# Patient Record
Sex: FEMALE | Race: WHITE | NOT HISPANIC OR LATINO | Employment: FULL TIME | URBAN - METROPOLITAN AREA
[De-identification: names, ages, dates, MRNs, and addresses within clinical notes are randomized per-mention and may not be internally consistent; named-entity substitution may affect disease eponyms.]

---

## 2018-03-01 PROBLEM — F43.20 GRIEF REACTION: Status: ACTIVE | Noted: 2018-03-01

## 2018-06-04 PROBLEM — F43.20 GRIEF REACTION: Status: RESOLVED | Noted: 2018-03-01 | Resolved: 2018-06-04

## 2019-07-09 PROBLEM — M79.671 RIGHT FOOT PAIN: Status: ACTIVE | Noted: 2019-07-09

## 2021-02-09 PROBLEM — O09.93 HIGH-RISK PREGNANCY IN THIRD TRIMESTER: Status: ACTIVE | Noted: 2020-10-19

## 2024-02-21 ENCOUNTER — TELEPHONE (OUTPATIENT)
Dept: HEALTH INFORMATION MANAGEMENT | Facility: OTHER | Age: 39
End: 2024-02-21

## 2024-04-02 PROBLEM — G43.709 CHRONIC MIGRAINE WITHOUT AURA WITHOUT STATUS MIGRAINOSUS, NOT INTRACTABLE: Status: ACTIVE | Noted: 2017-06-08

## 2024-04-02 PROBLEM — D32.9 MENINGIOMA (HCC): Status: ACTIVE | Noted: 2024-04-02

## 2024-04-02 PROBLEM — H47.323 OPTIC DISC DRUSEN, BILATERAL: Status: ACTIVE | Noted: 2024-01-01

## 2024-04-05 ENCOUNTER — TELEPHONE (OUTPATIENT)
Dept: NEUROLOGY | Facility: MEDICAL CENTER | Age: 39
End: 2024-04-05
Payer: COMMERCIAL

## 2024-04-05 NOTE — TELEPHONE ENCOUNTER
NEUROLOGY PATIENT PRE-VISIT PLANNING     Patient was NOT contacted to complete PVP.  Note: Patient will not be contacted if there is no indication to call.     Patient Appointment is scheduled as: Established Patient     Is visit type and length scheduled correctly? Yes    [facility] in Patient Demographics? Yes    3.   Is referral attached to visit? Yes    4. Were records received from referring provider? Yes    4. Patient was NOT contacted to have someone accompany them to visit.     5. Is this appointment scheduled as a Hospital Follow-Up?  No    6. Does the patient require any pre procedure or post procedure follow up? No    7. If any orders were placed at last visit or intended to be done for this visit do we have Results/Consult Notes? No  Labs - Labs were not ordered at last office visit.  Imaging - Imaging was not ordered at last office visit.  Referrals - Referral ordered, patient was seen and consult notes are in chart. Care Teams updated  YES.  Note: If patient appointment is for lab or imaging review and patient did not complete the studies, check with provider if OK to reschedule patient until completed.    8. If patient appointment is for Botox - is order pended for provider? N/A    9. Was Plan Assessment from last Neurology Office Visit Reviewed?  Yes

## 2024-04-09 ENCOUNTER — APPOINTMENT (OUTPATIENT)
Dept: NEUROLOGY | Facility: MEDICAL CENTER | Age: 39
End: 2024-04-09
Attending: INTERNAL MEDICINE
Payer: COMMERCIAL

## 2025-01-02 ENCOUNTER — HOSPITAL ENCOUNTER (OUTPATIENT)
Dept: LAB | Facility: MEDICAL CENTER | Age: 40
End: 2025-01-02
Attending: NURSE PRACTITIONER
Payer: COMMERCIAL

## 2025-01-02 LAB
25(OH)D3 SERPL-MCNC: 14 NG/ML (ref 30–100)
ALBUMIN SERPL BCP-MCNC: 4.3 G/DL (ref 3.2–4.9)
ALBUMIN/GLOB SERPL: 1.4 G/DL
ALP SERPL-CCNC: 71 U/L (ref 30–99)
ALT SERPL-CCNC: 26 U/L (ref 2–50)
ANION GAP SERPL CALC-SCNC: 7 MMOL/L (ref 7–16)
AST SERPL-CCNC: 15 U/L (ref 12–45)
BASOPHILS # BLD AUTO: 1.1 % (ref 0–1.8)
BASOPHILS # BLD: 0.09 K/UL (ref 0–0.12)
BILIRUB SERPL-MCNC: 0.3 MG/DL (ref 0.1–1.5)
BUN SERPL-MCNC: 10 MG/DL (ref 8–22)
CALCIUM ALBUM COR SERPL-MCNC: 9 MG/DL (ref 8.5–10.5)
CALCIUM SERPL-MCNC: 9.2 MG/DL (ref 8.5–10.5)
CHLORIDE SERPL-SCNC: 99 MMOL/L (ref 96–112)
CHOLEST SERPL-MCNC: 301 MG/DL (ref 100–199)
CO2 SERPL-SCNC: 27 MMOL/L (ref 20–33)
CREAT SERPL-MCNC: 0.56 MG/DL (ref 0.5–1.4)
EOSINOPHIL # BLD AUTO: 0.48 K/UL (ref 0–0.51)
EOSINOPHIL NFR BLD: 5.7 % (ref 0–6.9)
ERYTHROCYTE [DISTWIDTH] IN BLOOD BY AUTOMATED COUNT: 43.2 FL (ref 35.9–50)
EST. AVERAGE GLUCOSE BLD GHB EST-MCNC: 114 MG/DL
GFR SERPLBLD CREATININE-BSD FMLA CKD-EPI: 117 ML/MIN/1.73 M 2
GLOBULIN SER CALC-MCNC: 3 G/DL (ref 1.9–3.5)
GLUCOSE SERPL-MCNC: 94 MG/DL (ref 65–99)
HBA1C MFR BLD: 5.6 % (ref 4–5.6)
HCT VFR BLD AUTO: 45.3 % (ref 37–47)
HDLC SERPL-MCNC: ABNORMAL MG/DL
HGB BLD-MCNC: 13.9 G/DL (ref 12–16)
IMM GRANULOCYTES # BLD AUTO: 0.02 K/UL (ref 0–0.11)
IMM GRANULOCYTES NFR BLD AUTO: 0.2 % (ref 0–0.9)
LDLC SERPL CALC-MCNC: ABNORMAL MG/DL
LYMPHOCYTES # BLD AUTO: 3.03 K/UL (ref 1–4.8)
LYMPHOCYTES NFR BLD: 35.9 % (ref 22–41)
MCH RBC QN AUTO: 26.5 PG (ref 27–33)
MCHC RBC AUTO-ENTMCNC: 30.7 G/DL (ref 32.2–35.5)
MCV RBC AUTO: 86.3 FL (ref 81.4–97.8)
MONOCYTES # BLD AUTO: 0.59 K/UL (ref 0–0.85)
MONOCYTES NFR BLD AUTO: 7 % (ref 0–13.4)
NEUTROPHILS # BLD AUTO: 4.24 K/UL (ref 1.82–7.42)
NEUTROPHILS NFR BLD: 50.1 % (ref 44–72)
NRBC # BLD AUTO: 0 K/UL
NRBC BLD-RTO: 0 /100 WBC (ref 0–0.2)
PLATELET # BLD AUTO: 348 K/UL (ref 164–446)
PMV BLD AUTO: 11.7 FL (ref 9–12.9)
POTASSIUM SERPL-SCNC: 4.2 MMOL/L (ref 3.6–5.5)
PROT SERPL-MCNC: 7.3 G/DL (ref 6–8.2)
RBC # BLD AUTO: 5.25 M/UL (ref 4.2–5.4)
SODIUM SERPL-SCNC: 133 MMOL/L (ref 135–145)
TRIGL SERPL-MCNC: 1334 MG/DL (ref 0–149)
TSH SERPL DL<=0.005 MIU/L-ACNC: 0.98 UIU/ML (ref 0.38–5.33)
WBC # BLD AUTO: 8.5 K/UL (ref 4.8–10.8)

## 2025-01-02 PROCEDURE — 83036 HEMOGLOBIN GLYCOSYLATED A1C: CPT

## 2025-01-02 PROCEDURE — 84443 ASSAY THYROID STIM HORMONE: CPT

## 2025-01-02 PROCEDURE — 80061 LIPID PANEL: CPT

## 2025-01-02 PROCEDURE — 80053 COMPREHEN METABOLIC PANEL: CPT

## 2025-01-02 PROCEDURE — 82306 VITAMIN D 25 HYDROXY: CPT

## 2025-01-02 PROCEDURE — 36415 COLL VENOUS BLD VENIPUNCTURE: CPT

## 2025-01-02 PROCEDURE — 85025 COMPLETE CBC W/AUTO DIFF WBC: CPT

## 2025-01-06 ENCOUNTER — APPOINTMENT (OUTPATIENT)
Dept: LAB | Facility: MEDICAL CENTER | Age: 40
End: 2025-01-06
Payer: COMMERCIAL

## 2025-02-19 ENCOUNTER — TELEPHONE (OUTPATIENT)
Dept: CARDIOLOGY | Facility: MEDICAL CENTER | Age: 40
End: 2025-02-19
Payer: COMMERCIAL

## 2025-02-19 NOTE — TELEPHONE ENCOUNTER
Called patient in regards to records for NP appointment with Dr. SAUCEDO 3/13/25. To review most recent lab results, ekg, any cardiac testing or ,if patient has been treated by a cardiologist. No answer, left voicemail to call back

## 2025-03-02 NOTE — PROGRESS NOTES
Medical decision making:  -Still neuroma primary prevention which is very good.  We need to check lipoprotein a and apolipoprotein B.   -Starting rosuvastatin 40 mg, repeating lipids in 4 weeks, then most likely left to start fenofibrate.  Consider referral to vascular medicine.  I suspect she is going to need more targeted therapies for triglycerides which can include fish oil or Vascepa.  -Father had problems with statins.  If she has statin intolerance, we will check a CK, vitamin D, TSH and ask her to take co-Q10  -She may be interested in a calcium score, information given today.  This is optional since she is going on statin therapy anyway but she like to know more about her risk.  -Blood pressure might be a little bit on the low side.  Propranolol has yet to help with migraine headache so she is willing to try to double the dose and if blood pressure remains low, she can reduce amlodipine to 2.5 or discontinue altogether.  -Quit smoking which is fantastic.  -No evidence to say she needs primary prevention aspirin at this time.  I would reconsider if she had elevated lipoprotein a or calcification in the proximal LAD.  -Family-planning, tubal ligation.  Okay to take lisinopril.  No cough.      Problem list:  1. Primary hypertension  - EKG    2. Pre-eclampsia in second trimester    3. Mixed hyperlipidemia  - rosuvastatin (CRESTOR) 40 MG tablet; Take 1 Tablet by mouth every day.  Dispense: 90 Tablet; Refill: 3  - APOLIPOPROTEIN B; Future  - Lipoprotein (a); Future  - Lipid Profile; Future  - CT-CARDIAC SCORING; Future    4. Hypertriglyceridemia    5. Chronic migraine with aura, intractable, without status migrainosus  - propranolol LA (INDERAL LA) 60 MG CAPSULE SR 24 HR; Take 2 Capsules by mouth every day. For migraine prevention  Dispense: 180 Capsule; Refill: 3    6. Allergy to iodine    Other orders  - VITAMIN D PO; Take 5,000 Units by mouth every day.     Chief Complaint:   Chief Complaint   Patient  presents with    New Patient     NP Dx:Hyperlipidemia     History of Present Illness:  Nuris Mcgill is a 39 y.o. female who is referred by MARKOS Shafer for FH CAD/SCD, mixed HLP, HTN, iodine allergy, former smoker.     Father was very physically fit, taken off statin due to statin intolerance and myalgias, was planning to go out for a run, suffered MI and sudden cardiac death, found at home by his wife, her mother.  Her sister  suddenly at 44, we presume heart but were not sure, had hypertension, hyperlipidemia, DM2, smoker, nonadherent ended already had a stroke.    Hypertension, appears controlled.  Previously checking at home but not recently, will get back to it.  BP might actually be a little bit low.  Propranolol given as it may help with migraine headaches but no improvement yet.  Going up on the dose.  On lisinopril without a cough.    Cholesterol very abnormal, triglycerides over 1000.  LDL could not be calculated.  Has never tried medications.    Quit smoking in .    Family planning, no more pregnancies planned, had tubal ligation.    No history of diabetes.  No history of autoimmune disease such as lupus or rheumatoid arthritis.  No history of chest radiation or cardiotoxic chemotherapy.  No chronic kidney disease.  No ETOH overuse.  No caffeine overuse.  No recreation substance use.  No hx asthma.  Covid, recovered 100%.    Not limited by chest pain, pressure or tightness with activity.  No significant dyspnea on exertion, orthopnea or lower extremity swelling.  No significant palpitations, lightheadedness, or presyncope/syncope.  No symptoms of leg claudication.  No stroke/TIA like symptoms.    Lives in [place].   to Pedro.  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    Wt Readings from Last 5 Encounters:   25 88.5 kg (195 lb)   24 89.7 kg (197 lb 12 oz)   23 95.3 kg (210 lb 1.6 oz)   22 93.4 kg (206 lb)   22 93.8 kg (206 lb  "11.2 oz)       DATA REVIEWED by me:  ECG (my personal interpretation)  3-13-25 Sinus 55    Echo  5-4-22  Normal left ventricular size and systolic function.  Normal right ventricular size and systolic function.  Mild mitral regurgitation.  Mild tricuspid regurgitation.  Normal pericardium without effusion.  Compared to the prior echo on 01/13/21, similar findings.  1-13-21  Normal right and left ventricular size and function.   Left ventricular ejection fraction is visually estimated to be 65%.  Mild concentric left ventricular hypertrophy.  No significant valve disease or flow abnormalities.   Normal estimated right atrial pressure.   Unable to estimate pulmonary artery pressure due to an inadequate   tricuspid regurgitant jet.  No prior study is available for comparison.     Most recent labs:       Lab Results   Component Value Date/Time    WBC 8.5 01/02/2025 11:28 AM    HEMOGLOBIN 13.9 01/02/2025 11:28 AM    HEMATOCRIT 45.3 01/02/2025 11:28 AM    MCV 86.3 01/02/2025 11:28 AM    INR 0.91 12/20/2023 02:59 PM      Lab Results   Component Value Date/Time    SODIUM 133 (L) 01/02/2025 11:28 AM    POTASSIUM 4.2 01/02/2025 11:28 AM    CHLORIDE 99 01/02/2025 11:28 AM    CO2 27 01/02/2025 11:28 AM    GLUCOSE 94 01/02/2025 11:28 AM    BUN 10 01/02/2025 11:28 AM    CREATININE 0.56 01/02/2025 11:28 AM    CREATININE 0.7 11/12/2006 04:35 AM      Lab Results   Component Value Date/Time    ASTSGOT 15 01/02/2025 11:28 AM    ALTSGPT 26 01/02/2025 11:28 AM    ALBUMIN 4.3 01/02/2025 11:28 AM      Lab Results   Component Value Date/Time    CHOLSTRLTOT 301 (H) 01/02/2025 11:28 AM    LDL see below 01/02/2025 11:28 AM    HDL see below 01/02/2025 11:28 AM    TRIGLYCERIDE 1334 (H) 01/02/2025 11:28 AM     No results for input(s): \"NTPROBNP\", \"TROPONINT\" in the last 72 hours.      Past Medical History:   Diagnosis Date    Depression     Essential hypertension     Glaucoma 2024    Dr. Osman    Migraine     since 14 years old    " "Nephrolithiasis     Optic disc drusen, bilateral     Panic disorder     PTSD (post-traumatic stress disorder)      Past Surgical History:   Procedure Laterality Date    PRIMARY C SECTION WITH TUBAL LIGATION N/A 2022    Procedure:  SECTION, PRIMARY, WITH TUBAL LIGATION;  Surgeon: Mingo Beckham M.D.;  Location: [facility];  Service: Obstetrics    PRIMARY C SECTION N/A 3/2/2021    Procedure:  SECTION, PRIMARY;  Surgeon: Kimberly Parker M.D.;  Location: [facility];  Service: Obstetrics    CHOLECYSTECTOMY      MAMMOPLASTY AUGMENTATION  08    Performed by DEYA BRYANT at SURGERY [facility]    APPENDECTOMY      \"ruptured\"    OTHER      ablation back surgery    OTHER Right     skin graft on ear drum     Family History   Problem Relation Age of Onset    Hyperlipidemia Mother     Hypertension Mother     Heart Disease Father 64         of sudden MI, very fit    Heart Attack Father     Heart Disease Sister 44         at 44, no autopsy, DM, smoker, HTN, non-complaint    Stroke Sister     Diabetes Sister     Cancer Maternal Aunt         breast    Alcohol abuse Maternal Aunt     Cancer Maternal Grandmother         colon    Cancer Paternal Grandfather         colon     Social History     Socioeconomic History    Marital status:      Spouse name: Not on file    Number of children: Not on file    Years of education: Not on file    Highest education level: Not on file   Occupational History    Not on file   Tobacco Use    Smoking status: Former     Current packs/day: 0.00     Types: Cigarettes    Smokeless tobacco: Never   Vaping Use    Vaping status: Never Used   Substance and Sexual Activity    Alcohol use: Yes     Comment: occ     Drug use: No    Sexual activity: Yes     Partners: Male     Comment: Not  and planned pregnancy.   Other Topics Concern    Not on file   Social History Narrative    Not on file     Social " Drivers of Health     Financial Resource Strain: Not on file   Food Insecurity: No Food Insecurity (1/8/2021)    Hunger Vital Sign     Worried About Running Out of Food in the Last Year: Never true     Ran Out of Food in the Last Year: Never true   Transportation Needs: No Transportation Needs (1/8/2021)    PRAPARE - Transportation     Lack of Transportation (Medical): No     Lack of Transportation (Non-Medical): No   Physical Activity: Not on file   Stress: Not on file   Social Connections: Not on file   Intimate Partner Violence: Not on file   Housing Stability: Not on file     Allergies   Allergen Reactions    Ceftriaxone Hives     Tolerates cefazolin, penicillin    Iodine Contrast [[employer]] Hives    Pecan Hives and Swelling    [place] Hives and Swelling       Current Outpatient Medications   Medication Sig Dispense Refill    VITAMIN D PO Take 5,000 Units by mouth every day.      propranolol LA (INDERAL LA) 60 MG CAPSULE SR 24 HR Take 2 Capsules by mouth every day. For migraine prevention 180 Capsule 3    rosuvastatin (CRESTOR) 40 MG tablet Take 1 Tablet by mouth every day. 90 Tablet 3    latanoprost (XALATAN) 0.005 % Solution       ondansetron (ZOFRAN ODT) 4 MG TABLET DISPERSIBLE Take 1 Tablet by mouth every 8 hours as needed.      amLODIPine (NORVASC) 5 MG Tab Take 1 Tablet by mouth every day. 30 Tablet 0    fluoxetine (PROZAC) 40 MG capsule Take 40 mg by mouth every evening.      lisinopril (PRINIVIL) 40 MG tablet Take 40 mg by mouth every evening.      rizatriptan (MAXALT-MLT) 10 MG disintegrating tablet Take 1 Tablet by mouth one time as needed for Migraine for up to 1 dose. Can repeat dose after 2 hours if needed (Patient not taking: Reported on 3/13/2025) 10 Tablet 3    aspirin 81 MG EC tablet Take 81 mg by mouth every 6 hours as needed (headache). (Patient not taking: Reported on 3/13/2025)      oxymetazoline (AFRIN) 0.05 % Solution Administer 1 Spray into affected nostril(S) 2 times a  "day. (Patient not taking: Reported on 3/13/2025)       No current facility-administered medications for this visit.       ROS  All others systems reviewed and negative.    /60 (BP Location: Left arm, Patient Position: Sitting, BP Cuff Size: Adult)   Pulse 63   Resp 16   Ht 1.575 m (5' 2\")   Wt 88.5 kg (195 lb)   SpO2 95%  Body mass index is 35.67 kg/m².    General: No acute distress. Well nourished.  HEENT: EOM grossly intact, no scleral icterus, no pharyngeal erythema.   Neck:  No JVD, no bruits, trachea midline  CVS: RRR. Normal S1, S2. 1/6 sys murmur R/LSB. No LE edema.  2+ radial pulses, 2+ PT pulses  Resp: CTAB. No wheezing or crackles/rhonchi. Normal respiratory effort.  Abdomen: Soft, NT, no geraldo hepatomegaly.  MSK/Ext: No clubbing or cyanosis.  Skin: Warm and dry, no rashes.  Neurological: CN III-XII grossly intact. No focal deficits.   Psych: A&O x 3, appropriate affect, good judgement      Return in about 8 weeks (around 5/8/2025).    Written instructions given today:      CHOLESTEROL:  -Restart was rosuvastatin 40 mg taken once daily.  I recommend follow-up cholesterol blood test 4 to 6 weeks after starting the medication.  I will probably need to add fenofibrate to help with the triglycerides.  I might refer you to the vascular medicine clinic where we have people who still expertise is to work with your abnormal triglycerides.  -Abstain from all alcohol if you can  -The biggest risk to triglycerides being this elevated is acute pancreatitis.  -5 to 6% of the population gets achy muscles on the statins, read below about what to do if you think this might be you.  You should not suffer in silence.  We have ways to address statin intolerance.  -The fasting blood work I have ordered it can wait until after you have been on the rosuvastatin for at least 4 weeks.  Read below about ApoB and lipoprotein a.    BLOOD PRESSURE:  -Propranolol can be a great medication to help with migraine headaches.  " If you are not getting any improvement on the 60 mg, I recommend going up to 120 mg.  We do not worry about normal resting slow heart rates.  What matters is that your heart rate speeds up when you get up to walk around.    -If your blood pressure is consistently under 110 on the top number, you can cut your amlodipine in half to 2.5 mg or discontinue the medication altogether.    It is important to check blood pressure at home from time to time at home to ensure your blood pressure is in a good range because we do not check it correctly in the doctor's office.    Checking Blood Pressure:    -Be sure you have a good blood pressure cuff, spend in the $40-60, ideally a “calibrated” cuff such as Omron.  I recommend purchasing your cuff from a company with a good return policy.    -Your BP machine should be an automatic, upper arm cuff.  -Measure your arm circumference to get the correct size, probably adult Large. If your arm circumference is greater than the size listed (typically 16.5-17 in), you may need an XL cuff (order from a special pharmacy or durable medical equipment company).  If your arm is small you might need an Adult Small or XL.    -Sit with back support, legs uncrossed, feet flat on the floor.  Your arm should be propped up level with your heart but completely supported, dead weight.  -Put the cuff in place, then wait 5 minutes before pushing the button, 10 minutes would be ideal. No talking, no TV, no social media, etc. Set a timer on your phone.    -Do not check blood pressure within 30 minutes of exercise, caffeine, or large meals.    -It is ok to check immediately if you are experiencing a symptom which may be related to high or low blood pressure.    -Write your blood pressures down, keep a log and bring this to your appointments.    -Check no more than 1 time a day on normal days, maybe 1-2 times per week, try different times of day.  Typically do not check in the morning before your medications  unless this information is helpful for you.    -You are concerned your blood pressure cuff is not giving you accurate data, you can bring your cuff to at least one nursing appointment where it can be calibrated to a manual cuff.    -Goal blood pressure is at least under 130/80, ideally under 120/80.  If you think your BP is overall too high, reach out to your PCP or another provider for assistance.    Salt restriction is key for blood pressure control. Salt is killing Americans, it is in almost everything.  Salt=sodium=sea salt, guidelines say stay under 2,400 mg daily but I ask for under 3,000 mg daily if possible.  Get salt smart, start looking at labels, count it up.  Salt is hidden in everything, salad dressing, sauces, cheese, most canned food, any processed meat.     Primary prevention of heart attack and stroke:    Most people over 70 have some degree of calcified heart disease that is not clinically relevant. Unfortunately, placing a stent over heart disease that is not clinically relevant does not reduce mortality.  We cannot bypass blockages that are not 70% or more because the bypass grafts will not mature and will be lost.  Blockages in the heart artery should only have a stent or bypass if they are more than 70% blocked and causing symptoms (at which point most people have symptoms such as chest pain or unusual shortness of breath with activity that goes away with rest).  People having a sudden heart attack should have a stent placed in the blocked artery.  People having chest pain that limits their ability to function could consider having a stent placed in the artery.  Otherwise, medication and lifestyle changes are the preferred treatments and are very powerful.    Lifestyle and medications are typically more important than stents or bypass outside of a medical emergency.    The only things to do for calcified heart disease to lower your risk of sudden heart attack (or stroke which is the same  "disease but a different location- in the brain):  -Keep LDL cholesterol under 100 (or under 70 if you have already had a heart attack/stroke or documented vascular disease). Check Lipoprotein A and Apolipoprotein B to know more about your risk.  -If appropriate, take a statin medication (as a vascular medication, not just a cholesterol medication) which is our most powerful weapon to stabilize the plaques and reduce inflammation making them less likely to rupture open and cause a sudden heart attack/stroke.  -Control blood pressure, under 130/80, ideally closer to 120/80.  -Control blood sugar, don't get diabetes.  -Don't smoke.  -Eat a heart healthy diet that reduces inflammation: Pritikin, Mediterranean, Vegetarian, Vegan  -Get regular exercise: 150 minutes of moderate exercise OR 75 minutes of very vigorous exercise every week.  -Keep your body mass index under 30, ideal BMI is 20-25.  -Find demetrius in life, manage stress, develop close/meaningful personal relationships (proven to help people live longer)  -Know the signs and symptoms of heart attack and stroke and when to call 9-1-1.    Signs of a heart attack: Anything very intense coming from the chest that lasts more than 15 minutes, consider calling 911.  -Pain or pressure in the chest that is intense, lasts more than 15 minutes, not explained by other known reasons such as known/similar heartburn  -It can feel like severe heart burn but associated with nausea, vomiting  -It can be vague pain that radiates to the neck, jaw, shoulders, arm/arms, wrap around your back or even cause a toothache  -Can be associated with unusual shortness of breath at rest or sense of impending doom  *If you think you are having a heart attack, chew up 4 baby aspirin (81 mg) and call 911.    Signs of a stroke: sudden inability to talk, smile on one side, confusion, inability to move arm/leg on one side, numbness/tingling of arm/leg on one side that is not typical. \"Think " "FAST.\"  F=face drooping  A=arm weakness  S=Speech difficulty  T= time to call 911 (do not give ride to someone, call ambulance to alert the hospital to start stroke protocol)    Please look into the following food lifestyles:  Mediterranean diet.  Pritikin - used at [facility], probably one of the best for anti-inflammatory  Vegan/Vegetarian diet.  DASH diet.    Resources to learn more:  -American Heart Association  -Www.Cardiosmart.org, sponsored by the American College of cardiology.    Regarding statins:  -There is a lot of misinformation about statins.  -It is incredibly rare to have a debilitating reaction affecting the muscles.  -Some people have achy muscles without damage (this occurs in 5 to 6% of the population).  -If statin therapy is right for you and you cannot tolerate 1 statin, we have many different statins to try.  -Sometimes we find a reversible cause of statin intolerance such as vitamin D deficiency or co-Q10 deficiency.  -Keep working with your provider until you find a dose and brand of statin therapy that you can take without having any side effects.  -Side effects can also include abnormal liver testing which does not lead to permanent liver damage and sometimes we need to keep patients on statins and tolerate mildly abnormal liver function testing.  -For some people, blood sugars increase slightly on statins.  1 in 500 people with pre-diabetes become diabetic 6 months faster.  -Statins do not cause Alzheimer's  -If someone makes it to 75 without a reason to be on a statin for primary prevention then it is not necessary to start statin therapy.    Statin holiday:  Your cholesterol medication can cause muscle aches/sore joints but sometimes it can be difficult to know if it is happening.  Stop your statin therapy for 1 week, note if you feel better, resume the therapy, note if you feel worse again.  Report back to me if you think your statin is making you feel " worse.      Calcium Scores-AKA the tie breaker for statins  *we do not need this for you as we are going to start statins anyway, however if you are interested to know more about your risk, it is a reasonable thing to consider if it is affordable*:    A calcium score is a low radiation dose CT scan to look for calcification of the heart arteries.  It is a screening tool to help assess cardiovascular risk.  It can only reveal calcified disease in the heart arteries, it is still possible to have dark plaque coronary disease that cannot be seen on the scan.  It cannot tell us if the disease is on the inside or outside of the vessel, only whether or not it is there (like a pregnancy test, it is either positive or negative). I use it as a tool to decide if statin therapy is indicated (such as Lipitor or Crestor) to help with primary prevention of heart attack.  Statin therapy provides anti-inflammatory therapy to stabilize plaque in the vessels reducing the risk of plaque rupture heart attack (or stroke) but in doing so often leads to further calcification of the heart arteries and therefore I do not repeat the scan as it can look worse after statin therapy.  If you have other indications to be on statin therapy then I generally do not recommend the test.  It is a personal decision.  It is an out of pocket expense of approximately $100-200 and can be ordered at any time.  If done at [facility], we can review the images together. Let me know if you think this test is right for you.  Again, I typically only order it if it will /therapy, again such as starting statin therapy. I typically do not order the test in people over 70.      Family History of Heart Disease:  *If you have a strong family history of heart attack, bypass surgery or stents in the heart, consider having yourself and everyone in your family take a fasting blood test called Lipoprotein A to know more about your genetic risk.  Lipoprotein A,  "also known as \"Lipo little a,\" Lp(a)-things to know:  *Elevated Lp(a) is considered an independent risk factor for heart attack and stroke.  *It is possible to have a normal LDL cholesterol (LDL-C) and still have elevated Lp(a).  *Elevated Lp(a) is genetic/inherited 80-90% of the time.  *At present there are no targeted therapies but there are clinical trials.  *For some patients Repatha/Praluent (PCSK9 inhibitor therapies) have been shown to reduce Lp(a) levels but the long term benefit is not yet known.  *Traditional risk factor modification such as eating well and exercising does not impact Lp(a) levels.  *Lp(a) elevation is considered a triple threat because it leads to plaque build up in the blood vessels, increases inflammation which drives plaque rupture resulting in sudden heart attacks and strokes and is also pro-thrombotic (meaning promoting a blood clot to form in the setting of a plaque rupture event).  *Some people with elevated levels may benefit from aspirin therapy but we do not yet know who those people may be. A case by case discussion is needed with your provider.  *Although taking a statin does not lower the level of Lp(a), the statin can reduce the inflammation to protect against plaque rupture.  Eating well, exercising, lower stress, controlling blood sugar also reduce and offset inflammation and are still very important for your health.    *Apolipoprotein B is now known to be a better marker of risk over the traditional LDLc.      It is my pleasure to participate in the care of Ms. Mcgill.  Please do not hesitate to contact me with questions or concerns.    Madhavi Queen MD, [facility]  Cardiologist, [facility]    Please note that this dictation was created using voice recognition software. I have made every reasonable attempt to correct obvious errors, but it is possible there are errors of grammar and possibly content that I did not discover before finalizing " the note.

## 2025-03-03 ENCOUNTER — APPOINTMENT (OUTPATIENT)
Dept: OPHTHALMOLOGY | Facility: MEDICAL CENTER | Age: 40
End: 2025-03-03
Payer: COMMERCIAL

## 2025-03-13 ENCOUNTER — OFFICE VISIT (OUTPATIENT)
Dept: CARDIOLOGY | Facility: MEDICAL CENTER | Age: 40
End: 2025-03-13
Attending: INTERNAL MEDICINE
Payer: COMMERCIAL

## 2025-03-13 VITALS
RESPIRATION RATE: 16 BRPM | OXYGEN SATURATION: 95 % | HEIGHT: 62 IN | BODY MASS INDEX: 35.88 KG/M2 | WEIGHT: 195 LBS | DIASTOLIC BLOOD PRESSURE: 60 MMHG | SYSTOLIC BLOOD PRESSURE: 100 MMHG | HEART RATE: 63 BPM

## 2025-03-13 DIAGNOSIS — E78.1 HYPERTRIGLYCERIDEMIA: ICD-10-CM

## 2025-03-13 DIAGNOSIS — I10 PRIMARY HYPERTENSION: ICD-10-CM

## 2025-03-13 DIAGNOSIS — E78.2 MIXED HYPERLIPIDEMIA: ICD-10-CM

## 2025-03-13 DIAGNOSIS — G43.E19 CHRONIC MIGRAINE WITH AURA, INTRACTABLE, WITHOUT STATUS MIGRAINOSUS: ICD-10-CM

## 2025-03-13 DIAGNOSIS — O14.92 PRE-ECLAMPSIA IN SECOND TRIMESTER: ICD-10-CM

## 2025-03-13 DIAGNOSIS — Z88.8 ALLERGY TO IODINE: ICD-10-CM

## 2025-03-13 LAB — EKG IMPRESSION: NORMAL

## 2025-03-13 PROCEDURE — 93005 ELECTROCARDIOGRAM TRACING: CPT | Mod: TC | Performed by: INTERNAL MEDICINE

## 2025-03-13 PROCEDURE — 93010 ELECTROCARDIOGRAM REPORT: CPT | Performed by: INTERNAL MEDICINE

## 2025-03-13 PROCEDURE — 99204 OFFICE O/P NEW MOD 45 MIN: CPT | Performed by: INTERNAL MEDICINE

## 2025-03-13 PROCEDURE — 99213 OFFICE O/P EST LOW 20 MIN: CPT | Performed by: INTERNAL MEDICINE

## 2025-03-13 PROCEDURE — 3074F SYST BP LT 130 MM HG: CPT | Performed by: INTERNAL MEDICINE

## 2025-03-13 PROCEDURE — 3078F DIAST BP <80 MM HG: CPT | Performed by: INTERNAL MEDICINE

## 2025-03-13 RX ORDER — ROSUVASTATIN CALCIUM 40 MG/1
40 TABLET, COATED ORAL DAILY
Qty: 90 TABLET | Refills: 3 | Status: SHIPPED | OUTPATIENT
Start: 2025-03-13 | End: 2026-04-17

## 2025-03-13 RX ORDER — PROPRANOLOL HYDROCHLORIDE 60 MG/1
120 CAPSULE, EXTENDED RELEASE ORAL DAILY
Qty: 180 CAPSULE | Refills: 3 | Status: SHIPPED | OUTPATIENT
Start: 2025-03-13 | End: 2026-03-13

## 2025-03-13 ASSESSMENT — FIBROSIS 4 INDEX: FIB4 SCORE: 0.35

## 2025-03-13 NOTE — PATIENT INSTRUCTIONS
CHOLESTEROL:  -Restart was rosuvastatin 40 mg taken once daily.  I recommend follow-up cholesterol blood test 4 to 6 weeks after starting the medication.  I will probably need to add fenofibrate to help with the triglycerides.  I might refer you to the vascular medicine clinic where we have people who still expertise is to work with your abnormal triglycerides.  -Abstain from all alcohol if you can  -The biggest risk to triglycerides being this elevated is acute pancreatitis.  -5 to 6% of the population gets achy muscles on the statins, read below about what to do if you think this might be you.  You should not suffer in silence.  We have ways to address statin intolerance.  -The fasting blood work I have ordered it can wait until after you have been on the rosuvastatin for at least 4 weeks.  Read below about ApoB and lipoprotein a.    BLOOD PRESSURE:  -Propranolol can be a great medication to help with migraine headaches.  If you are not getting any improvement on the 60 mg, I recommend going up to 120 mg.  We do not worry about normal resting slow heart rates.  What matters is that your heart rate speeds up when you get up to walk around.    -If your blood pressure is consistently under 110 on the top number, you can cut your amlodipine in half to 2.5 mg or discontinue the medication altogether.    It is important to check blood pressure at home from time to time at home to ensure your blood pressure is in a good range because we do not check it correctly in the doctor's office.    Checking Blood Pressure:    -Be sure you have a good blood pressure cuff, spend in the $40-60, ideally a “calibrated” cuff such as Omron.  I recommend purchasing your cuff from a company with a good return policy.    -Your BP machine should be an automatic, upper arm cuff.  -Measure your arm circumference to get the correct size, probably adult Large. If your arm circumference is greater than the size listed (typically 16.5-17 in), you  may need an XL cuff (order from a special pharmacy or durable medical equipment company).  If your arm is small you might need an Adult Small or XL.    -Sit with back support, legs uncrossed, feet flat on the floor.  Your arm should be propped up level with your heart but completely supported, dead weight.  -Put the cuff in place, then wait 5 minutes before pushing the button, 10 minutes would be ideal. No talking, no TV, no social media, etc. Set a timer on your phone.    -Do not check blood pressure within 30 minutes of exercise, caffeine, or large meals.    -It is ok to check immediately if you are experiencing a symptom which may be related to high or low blood pressure.    -Write your blood pressures down, keep a log and bring this to your appointments.    -Check no more than 1 time a day on normal days, maybe 1-2 times per week, try different times of day.  Typically do not check in the morning before your medications unless this information is helpful for you.    -You are concerned your blood pressure cuff is not giving you accurate data, you can bring your cuff to at least one nursing appointment where it can be calibrated to a manual cuff.    -Goal blood pressure is at least under 130/80, ideally under 120/80.  If you think your BP is overall too high, reach out to your PCP or another provider for assistance.    Salt restriction is key for blood pressure control. Salt is killing Americans, it is in almost everything.  Salt=sodium=sea salt, guidelines say stay under 2,400 mg daily but I ask for under 3,000 mg daily if possible.  Get salt smart, start looking at labels, count it up.  Salt is hidden in everything, salad dressing, sauces, cheese, most canned food, any processed meat.     Primary prevention of heart attack and stroke:    Most people over 70 have some degree of calcified heart disease that is not clinically relevant. Unfortunately, placing a stent over heart disease that is not clinically  relevant does not reduce mortality.  We cannot bypass blockages that are not 70% or more because the bypass grafts will not mature and will be lost.  Blockages in the heart artery should only have a stent or bypass if they are more than 70% blocked and causing symptoms (at which point most people have symptoms such as chest pain or unusual shortness of breath with activity that goes away with rest).  People having a sudden heart attack should have a stent placed in the blocked artery.  People having chest pain that limits their ability to function could consider having a stent placed in the artery.  Otherwise, medication and lifestyle changes are the preferred treatments and are very powerful.    Lifestyle and medications are typically more important than stents or bypass outside of a medical emergency.    The only things to do for calcified heart disease to lower your risk of sudden heart attack (or stroke which is the same disease but a different location- in the brain):  -Keep LDL cholesterol under 100 (or under 70 if you have already had a heart attack/stroke or documented vascular disease). Check Lipoprotein A and Apolipoprotein B to know more about your risk.  -If appropriate, take a statin medication (as a vascular medication, not just a cholesterol medication) which is our most powerful weapon to stabilize the plaques and reduce inflammation making them less likely to rupture open and cause a sudden heart attack/stroke.  -Control blood pressure, under 130/80, ideally closer to 120/80.  -Control blood sugar, don't get diabetes.  -Don't smoke.  -Eat a heart healthy diet that reduces inflammation: Pritikin, Mediterranean, Vegetarian, Vegan  -Get regular exercise: 150 minutes of moderate exercise OR 75 minutes of very vigorous exercise every week.  -Keep your body mass index under 30, ideal BMI is 20-25.  -Find demetrius in life, manage stress, develop close/meaningful personal relationships (proven to help people  "live longer)  -Know the signs and symptoms of heart attack and stroke and when to call 9-1-1.    Signs of a heart attack: Anything very intense coming from the chest that lasts more than 15 minutes, consider calling 911.  -Pain or pressure in the chest that is intense, lasts more than 15 minutes, not explained by other known reasons such as known/similar heartburn  -It can feel like severe heart burn but associated with nausea, vomiting  -It can be vague pain that radiates to the neck, jaw, shoulders, arm/arms, wrap around your back or even cause a toothache  -Can be associated with unusual shortness of breath at rest or sense of impending doom  *If you think you are having a heart attack, chew up 4 baby aspirin (81 mg) and call 911.    Signs of a stroke: sudden inability to talk, smile on one side, confusion, inability to move arm/leg on one side, numbness/tingling of arm/leg on one side that is not typical. \"Think FAST.\"  F=face drooping  A=arm weakness  S=Speech difficulty  T= time to call 911 (do not give ride to someone, call ambulance to alert the hospital to start stroke protocol)    Please look into the following food lifestyles:  Mediterranean diet.  Pritikin - used at [facility], probably one of the best for anti-inflammatory  Vegan/Vegetarian diet.  DASH diet.    Resources to learn more:  -American Heart Association  -Www.Cardiosmart.org, sponsored by the American College of cardiology.    Regarding statins:  -There is a lot of misinformation about statins.  -It is incredibly rare to have a debilitating reaction affecting the muscles.  -Some people have achy muscles without damage (this occurs in 5 to 6% of the population).  -If statin therapy is right for you and you cannot tolerate 1 statin, we have many different statins to try.  -Sometimes we find a reversible cause of statin intolerance such as vitamin D deficiency or co-Q10 deficiency.  -Keep working with your provider until you " find a dose and brand of statin therapy that you can take without having any side effects.  -Side effects can also include abnormal liver testing which does not lead to permanent liver damage and sometimes we need to keep patients on statins and tolerate mildly abnormal liver function testing.  -For some people, blood sugars increase slightly on statins.  1 in 500 people with pre-diabetes become diabetic 6 months faster.  -Statins do not cause Alzheimer's  -If someone makes it to 75 without a reason to be on a statin for primary prevention then it is not necessary to start statin therapy.    Statin holiday:  Your cholesterol medication can cause muscle aches/sore joints but sometimes it can be difficult to know if it is happening.  Stop your statin therapy for 1 week, note if you feel better, resume the therapy, note if you feel worse again.  Report back to me if you think your statin is making you feel worse.      Calcium Scores-AKA the tie breaker for statins *we do not need this for you as we are going to start statins anyway, however if you are interested to know more about your risk, it is a reasonable thing to consider if it is affordable*:    A calcium score is a low radiation dose CT scan to look for calcification of the heart arteries.  It is a screening tool to help assess cardiovascular risk.  It can only reveal calcified disease in the heart arteries, it is still possible to have dark plaque coronary disease that cannot be seen on the scan.  It cannot tell us if the disease is on the inside or outside of the vessel, only whether or not it is there (like a pregnancy test, it is either positive or negative). I use it as a tool to decide if statin therapy is indicated (such as Lipitor or Crestor) to help with primary prevention of heart attack.  Statin therapy provides anti-inflammatory therapy to stabilize plaque in the vessels reducing the risk of plaque rupture heart attack (or stroke) but in doing so  "often leads to further calcification of the heart arteries and therefore I do not repeat the scan as it can look worse after statin therapy.  If you have other indications to be on statin therapy then I generally do not recommend the test.  It is a personal decision.  It is an out of pocket expense of approximately $100-200 and can be ordered at any time.  If done at [facility], we can review the images together. Let me know if you think this test is right for you.  Again, I typically only order it if it will /therapy, again such as starting statin therapy. I typically do not order the test in people over 70.      Family History of Heart Disease:  *If you have a strong family history of heart attack, bypass surgery or stents in the heart, consider having yourself and everyone in your family take a fasting blood test called Lipoprotein A to know more about your genetic risk.  Lipoprotein A, also known as \"Lipo little a,\" Lp(a)-things to know:  *Elevated Lp(a) is considered an independent risk factor for heart attack and stroke.  *It is possible to have a normal LDL cholesterol (LDL-C) and still have elevated Lp(a).  *Elevated Lp(a) is genetic/inherited 80-90% of the time.  *At present there are no targeted therapies but there are clinical trials.  *For some patients Repatha/Praluent (PCSK9 inhibitor therapies) have been shown to reduce Lp(a) levels but the long term benefit is not yet known.  *Traditional risk factor modification such as eating well and exercising does not impact Lp(a) levels.  *Lp(a) elevation is considered a triple threat because it leads to plaque build up in the blood vessels, increases inflammation which drives plaque rupture resulting in sudden heart attacks and strokes and is also pro-thrombotic (meaning promoting a blood clot to form in the setting of a plaque rupture event).  *Some people with elevated levels may benefit from aspirin therapy but we do not yet know who those " people may be. A case by case discussion is needed with your provider.  *Although taking a statin does not lower the level of Lp(a), the statin can reduce the inflammation to protect against plaque rupture.  Eating well, exercising, lower stress, controlling blood sugar also reduce and offset inflammation and are still very important for your health.    *Apolipoprotein B is now known to be a better marker of risk over the traditional LDLc.

## 2025-04-03 ENCOUNTER — APPOINTMENT (OUTPATIENT)
Dept: OPHTHALMOLOGY | Facility: MEDICAL CENTER | Age: 40
End: 2025-04-03
Payer: COMMERCIAL

## 2025-04-03 ENCOUNTER — PATIENT MESSAGE (OUTPATIENT)
Dept: OPHTHALMOLOGY | Facility: MEDICAL CENTER | Age: 40
End: 2025-04-03

## 2025-04-03 DIAGNOSIS — G43.709 CHRONIC MIGRAINE WITHOUT AURA WITHOUT STATUS MIGRAINOSUS, NOT INTRACTABLE: ICD-10-CM

## 2025-04-03 DIAGNOSIS — D32.9 MENINGIOMA (HCC): ICD-10-CM

## 2025-04-03 DIAGNOSIS — H47.323 OPTIC DISC DRUSEN, BILATERAL: ICD-10-CM

## 2025-04-03 PROCEDURE — 99213 OFFICE O/P EST LOW 20 MIN: CPT | Mod: 25 | Performed by: STUDENT IN AN ORGANIZED HEALTH CARE EDUCATION/TRAINING PROGRAM

## 2025-04-03 PROCEDURE — 92133 CPTRZD OPH DX IMG PST SGM ON: CPT | Performed by: STUDENT IN AN ORGANIZED HEALTH CARE EDUCATION/TRAINING PROGRAM

## 2025-04-03 ASSESSMENT — VISUAL ACUITY
OS_SC+: -1
OD_PH_SC+: -1
METHOD: SNELLEN - LINEAR
OD_SC: 20/30
OD_PH_SC: 20/20
OS_SC: 20/20

## 2025-04-03 ASSESSMENT — CONF VISUAL FIELD
OS_INFERIOR_NASAL_RESTRICTION: 0
OS_INFERIOR_TEMPORAL_RESTRICTION: 0
OD_INFERIOR_NASAL_RESTRICTION: 0
OD_SUPERIOR_NASAL_RESTRICTION: 0
OD_SUPERIOR_TEMPORAL_RESTRICTION: 0
OS_SUPERIOR_TEMPORAL_RESTRICTION: 0
OD_INFERIOR_TEMPORAL_RESTRICTION: 0
OS_SUPERIOR_NASAL_RESTRICTION: 0
OD_NORMAL: 1
OS_NORMAL: 1

## 2025-04-03 ASSESSMENT — REFRACTION_MANIFEST
OS_SPHERE: -1.25
OD_CYLINDER: +0.50
OD_AXIS: 004
OD_SPHERE: -1.25
OS_AXIS: 151
METHOD_AUTOREFRACTION: 1
OS_CYLINDER: +0.75

## 2025-04-03 ASSESSMENT — REFRACTION_WEARINGRX
OD_SPHERE: -1.50
OD_AXIS: 009
OD_CYLINDER: +0.75
SPECS_TYPE: SVL
OS_CYLINDER: +0.75
OS_SPHERE: -1.25
OS_AXIS: 156

## 2025-04-03 ASSESSMENT — SLIT LAMP EXAM - LIDS
COMMENTS: NORMAL
COMMENTS: NORMAL

## 2025-04-03 ASSESSMENT — CUP TO DISC RATIO
OD_RATIO: 0.0
OS_RATIO: 0.0

## 2025-04-03 ASSESSMENT — EXTERNAL EXAM - LEFT EYE: OS_EXAM: NORMAL

## 2025-04-03 ASSESSMENT — TONOMETRY
OS_IOP_MMHG: 13
IOP_METHOD: I-CARE
OD_IOP_MMHG: 14

## 2025-04-03 ASSESSMENT — EXTERNAL EXAM - RIGHT EYE: OD_EXAM: NORMAL

## 2025-04-03 NOTE — ASSESSMENT & PLAN NOTE
MRI reyna coronao reported incidental 8x3mm meningioma in planum sphenoidale region. Will plan for repeat MRI brain wwo to monitor for stability

## 2025-04-03 NOTE — ASSESSMENT & PLAN NOTE
Onset around age 14 with worsening in 2016. Headaches are nearly daily and described below.      Location: frontal/temporal region, R>L  Characteristic: throbbing, pressure  Severity: 3-10/10  Associated symptoms: light sensitivity, sound sensitivity, nausea  Duration: several hours to several days  Frequency: initially occurring daily but now occurring once a week  Current preventative: propanolol 120mg   Failed medications: topamax, imitrex     Plan:   Improved. Continue on propanolol 120mg with prn ibuprofen

## 2025-04-03 NOTE — PROGRESS NOTES
Peds/Neuro Ophthalmology:   Raymon Marshall M.D.    Date & Time note created:    4/3/2025   12:31 PM     Referring MD / APRN:  BERNIE Pablo., No att. providers found    Patient ID:  Name:             Nuris Toth   YOB: 1985  Age:                 38 y.o.  female   MRN:               [id number]    Chief Complaint/Reason for Visit:     Other (1 year f.v. 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 Her headaches have been well controlled on propanolol now occurring once a week. She has no blurred vision, double vision unless her BP is high.     As a recap  Ms Toth currently denies any concerns regarding her vision. She denies any blurred vision, double vision, loss of peripheral vision. She was seen by Dr Woodward for the first time for an updated rx.     Ms Toth was recently seen in the [facility] ED 12/20/23 for possible optic disc edema. Patient was sent to the ED by her optometrist Dr Woodward who noted optic disc edema and optic nerve head drusen. Due to reported headaches, she was sent to the EDto rule out increased ICP. CTH wo was negative for intracranial abnormality. MRI brain ww demonstrated prominent optic nerve sheaths with flattening of the R optic nerve root. Incidental meningioma was noted in the planum sphenoidum measuring 8x3mm. LP was performed and demonstrated an opening pressure of 8cmH20. CSF wbc 3, glucose 56, protein 31.     At her initial exam there was no concerns for IIH or optic disc elevation, and appearance of optic nerves was thought to be secondary to optic disc drusen. Discussed the benign prognosis of optic disc drusen and recommendation for routine annual evaluation. Pt has been started on Lumigan as protective/preventative therapy for optic drusen. She was recommended to return  "in 1 years time    In regards to migraine history, patient reports onset around age 14 with worsening in 2016. Headaches are nearly daily and described below. Pt follows with Dr Veronica for headache management and was recently started on propanolol 60mg daily for prevention and maxalt for abortive therapy    Location: frontal/temporal region, R>L  Characteristic: throbbing, pressure  Severity: 3-10/10  Associated symptoms: light sensitivity, sound sensitivity, nausea  Duration: several hours to several days  Frequency: daily baseline headache, severe headaches tend to occur weekly. Now improved to once weekly   Current preventative: propanolol 120mg  Failed medications: topamax, imitrex          Review of Systems:  ROS    Past Medical History:   Past Medical History:   Diagnosis Date    Depression     Essential hypertension     Glaucoma     Dr. Osman    Migraine     since 14 years old    Nephrolithiasis     Optic disc drusen, bilateral     Panic disorder     PTSD (post-traumatic stress disorder)        Past Surgical History:  Past Surgical History:   Procedure Laterality Date    PRIMARY C SECTION WITH TUBAL LIGATION N/A 2022    Procedure:  SECTION, PRIMARY, WITH TUBAL LIGATION;  Surgeon: Mingo Beckham M.D.;  Location: SURGERY LABOR [facility] DELIVERY;  Service: Obstetrics    PRIMARY C SECTION N/A 3/2/2021    Procedure:  SECTION, PRIMARY;  Surgeon: Kimberly Parker M.D.;  Location: SURGERY LABOR AND DELIVERY;  Service: Obstetrics    CHOLECYSTECTOMY  2015    MAMMOPLASTY AUGMENTATION  08    Performed by DEYA BRYANT at [facility] ORS    APPENDECTOMY      \"ruptured\"    OTHER      ablation back surgery    OTHER Right     skin graft on ear drum       Current Outpatient Medications:  Current Outpatient Medications   Medication Sig Dispense Refill    VITAMIN D PO Take 5,000 Units by mouth every day.      propranolol LA (INDERAL LA) 60 MG CAPSULE SR 24 HR Take 2 " Capsules by mouth every day. For migraine prevention 180 Capsule 3    rosuvastatin (CRESTOR) 40 MG tablet Take 1 Tablet by mouth every day. 90 Tablet 3    latanoprost (XALATAN) 0.005 % Solution       fluoxetine (PROZAC) 40 MG capsule Take 40 mg by mouth every evening.      lisinopril (PRINIVIL) 40 MG tablet Take 40 mg by mouth every evening.       No current facility-administered medications for this visit.       Allergies:  Allergies   Allergen Reactions    Ceftriaxone Hives     Tolerates cefazolin, penicillin    Iodine Contrast [Diagnostic X-Ray Materials] Hives    Pecan Hives and Swelling    McDonald Hives and Swelling       Family History:  Family History   Problem Relation Age of Onset    Hyperlipidemia Mother     Hypertension Mother     Heart Disease Father 64         of sudden MI, very fit    Heart Attack Father     Heart Disease Sister 44         at 44, no autopsy, DM, smoker, HTN, non-complaint    Stroke Sister     Diabetes Sister     Cancer Maternal Aunt         breast    Alcohol abuse Maternal Aunt     Cancer Maternal Grandmother         colon    Cancer Paternal Grandfather         colon       Social History:  Social History     Socioeconomic History    Marital status:      Spouse name: Not on file    Number of children: Not on file    Years of education: Not on file    Highest education level: Not on file   Occupational History    Not on file   Tobacco Use    Smoking status: Former     Current packs/day: 0.00     Types: Cigarettes    Smokeless tobacco: Never   Vaping Use    Vaping status: Never Used   Substance and Sexual Activity    Alcohol use: Yes     Comment: occ     Drug use: No    Sexual activity: Yes     Partners: Male     Comment: Not  and planned pregnancy.   Other Topics Concern    Not on file   Social History Narrative    Not on file     Social Drivers of Health     Financial Resource Strain: Not on file   Food Insecurity: No Food Insecurity (2021)    Hunger Vital Sign      Worried About Running Out of Food in the Last Year: Never true     Ran Out of Food in the Last Year: Never true   Transportation Needs: No Transportation Needs (1/8/2021)    PRAPARE - Transportation     Lack of Transportation (Medical): No     Lack of Transportation (Non-Medical): No   Physical Activity: Not on file   Stress: Not on file   Social Connections: Not on file   Intimate Partner Violence: Not on file   Housing Stability: Not on file          Physical Exam:  Physical Exam    Oriented x 3  Weight/BMI: There is no height or weight on file to calculate BMI.  There were no vitals taken for this visit.    Base Eye Exam       Visual Acuity (Snellen - Linear)         Right Left    Dist sc 20/30 20/20 -1    Dist ph sc 20/20 -1               Tonometry (i-care, 10:30 AM)         Right Left    Pressure 14 13              Pupils         Dark Light Shape React APD    Right 5 4 Round Brisk None    Left 5 4 Round Brisk None              Visual Fields         Right Left     Full Full              Extraocular Movement         Right Left     Full, Ortho Full, Ortho              Neuro/Psych       Oriented x3: Yes    Mood/Affect: Normal                  Additional Tests       Color         Right Left    Ishihara 9/9 9/9              Stereo       Fly: +    Animals: 3/3    Circles: 7/9                  Slit Lamp and Fundus Exam       External Exam         Right Left    External Normal Normal              Slit Lamp Exam         Right Left    Lids/Lashes Normal Normal    Conjunctiva/Sclera White and quiet White and quiet    Cornea Clear Clear    Anterior Chamber Deep and quiet Deep and quiet    Iris Round and reactive Round and reactive    Lens Clear Clear              Fundus Exam         Right Left    Disc anomalous, optic nerve head drusen anomalous, optic nerve head drusen    C/D Ratio 0.0 0.0    Macula Normal Normal                  Refraction       Wearing Rx         Sphere Cylinder Axis    Right -1.50 +0.75 009    Left  -1.25 +0.75 156      Age: 1yr    Type: SVL              Manifest Refraction (Auto)         Sphere Cylinder Axis    Right -1.25 +0.50 004    Left -1.25 +0.75 151                    Pertinent Lab/Test/Imaging Review:  MRI brain wwo 12/20/23 (personally reviewed)    IMPRESSION:  1.  There is no MR evidence of optic neuritis.  2.  There is prominent optic nerve sheath. There is flattening of the right optic nerve root.. This may be a normal variant and can be seen in the patients with pseudotumor cerebri. Please correlate with the clinical findings.  3.  Incidentally noted is an approximately 8 x 3 mm sized extra-axial lesion in the planum sphenoidale likely representing a small meningioma.    LP 12/21/23: opening pressure 8cm H20; CSF wbc 3, glucose 56, protein 31.   Assessment and Plan:     Optic disc drusen, bilateral  37 yo woman with PMH of migraine who presents for evaluation of papilledema vs pseudopapilledmea    Seen by optometrist 12/20/23 for routine eye exam. Denies any visual concerns. Exam demonstrated optic disc elevation and optic nerve head drusen. Sent to the ED for work up of increased ICP. MRI orbits/brain demonstrated prominent optic nerve sheathes and flattening of R optic nerve root. LP demonstrated opening pressure of 8cmH2O. 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 Afferent visual function otherwise normal without APD, VA 20/20, full color plates. RNFL 63 OD and 70 OS.     Exam 4/3/25: Stable afferent visual exam. Crowded optic nerves with ONH drusen. RNFL 63 OD 71    Plan:   Exam and RNFL stable from last year. No concerns for IIH or optic disc elevation. appearance of optic nerves is secondary to optic disc drusen. Pt has been started on Lumigan as protective/preventative therapy for optic drusen. RTC in 1 years time    -RTC in 1 years time, sooner if symptoms develop    Meningioma (HCC)  MRI  reyna wwo reported incidental 8x3mm meningioma in planum sphenoidale region. Will plan for repeat MRI brain wwo to monitor for stability    Chronic migraine without aura without status migrainosus, not intractable  Onset around age 14 with worsening in 2016. Headaches are nearly daily and described below.      Location: frontal/temporal region, R>L  Characteristic: throbbing, pressure  Severity: 3-10/10  Associated symptoms: light sensitivity, sound sensitivity, nausea  Duration: several hours to several days  Frequency: initially occurring daily but now occurring once a week  Current preventative: propanolol 120mg   Failed medications: topamax, imitrex     Plan:   Improved. Continue on propanolol 120mg with prn ibuprofen          Raymon Marshall M.D.    28 total minutes were spent reviewing imaging, records, examining the patient and documenting.

## 2025-04-03 NOTE — ASSESSMENT & PLAN NOTE
39 yo woman with PMH of migraine who presents for evaluation of papilledema vs pseudopapilledmea    Seen by optometrist 12/20/23 for routine eye exam. Denies any visual concerns. Exam demonstrated optic disc elevation and optic nerve head drusen. Sent to the ED for work up of increased ICP. MRI orbits/brain demonstrated prominent optic nerve sheathes and flattening of R optic nerve root. LP demonstrated opening pressure of 8cmH2O. 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 Afferent visual function otherwise normal without APD, VA 20/20, full color plates. RNFL 63 OD and 70 OS.     Exam 4/3/25: Stable afferent visual exam. Crowded optic nerves with ONH drusen. RNFL 63 OD 71    Plan:   Exam and RNFL stable from last year. No concerns for IIH or optic disc elevation. appearance of optic nerves is secondary to optic disc drusen. Pt has been started on Lumigan as protective/preventative therapy for optic drusen. RTC in 1 years time    -RTC in 1 years time, sooner if symptoms develop

## 2025-04-10 RX ORDER — DIAZEPAM 5 MG/1
5 TABLET ORAL PRN
Qty: 2 TABLET | Refills: 0 | Status: SHIPPED | OUTPATIENT
Start: 2025-04-10 | End: 2025-07-10

## 2025-04-30 ENCOUNTER — TELEPHONE (OUTPATIENT)
Dept: CARDIOLOGY | Facility: MEDICAL CENTER | Age: 40
End: 2025-04-30
Payer: COMMERCIAL

## 2025-04-30 NOTE — TELEPHONE ENCOUNTER
LS    Caller: Nuris Mcgill    Topic/issue: Patient returning missed call - wanted me to pass along that she had Lab appointment on 5/6 and will be completed the labs that were ordered by LS on that date.     Callback Number: [phone]    Thank you,  Shaneka AGUILERA

## 2025-05-04 NOTE — PROGRESS NOTES
Medical decision making:  -Still in the realm of primary prevention which is very good.    -Cholesterol panel looking significantly better on rosuvastatin 40 mg which she is tolerating.  -Triglycerides dropped from a fantastic 1334-250 so we do not need fenofibrate at this point.  -Apolipoprotein B is elevated at 97, goal is less than 90.  Lp(a) fortunately is normal.  Adding ezetimibe 40 mg.  -Thereafter, we could consider Vascepa or fish oil for triglycerides.  -Father had problems with statins.  If she has statin intolerance, we will check a CK, vitamin D, TSH and ask her to take co-Q10.  So far so good.  -Calcium score is pending to know more about her risk in addition to hs-CRP.  -Blood pressure only elevated at times, has and needed amlodipine.  -Increased Brandl helping even more with migraine headaches, she can take an additional 60 mg dose as needed.  She will stay on 120 mg long-acting for maintenance.  -Quit smoking which is fantastic.  -No evidence to say she needs primary prevention aspirin at this time.  I would reconsider if she had elevated lipoprotein a or calcification in the proximal LAD.  -Family-planning, tubal ligation.  Okay to take lisinopril.  No cough.  -Room to improve with physical activity and exercise when she is able.  - RTC with me in about 3 months after follow-up blood work, virtual visit okay.      Problem list:  1. Mixed hyperlipidemia  - ezetimibe (ZETIA) 10 MG Tab; Take 1 Tablet by mouth every day.  Dispense: 90 Tablet; Refill: 3  - APOLIPOPROTEIN B; Future  - CRP HIGH SENSITIVE (CARDIAC); Future  - Lipid Profile; Future    2. Primary hypertension  - propranolol CR (INDERAL LA) 120 MG CAPSULE SR 24 HR; Take 1 Capsule by mouth every day.  Dispense: 90 Capsule; Refill: 3    3. Pre-eclampsia in second trimester    4. Allergy to iodine    5. Chronic migraine with aura, intractable, without status migrainosus  - propranolol CR (INDERAL LA) 120 MG CAPSULE SR 24 HR; Take 1  Capsule by mouth every day.  Dispense: 90 Capsule; Refill: 3  - propranolol LA (INDERAL LA) 60 MG CAPSULE SR 24 HR; Take 1 Capsule by mouth see administration instructions. 1 tablet as needed for migraine headache    6. Former smoker    7. Family history of coronary artery disease in father    8. Family history of sudden cardiac death    Other orders  - HYDROcodone-acetaminophen (NORCO) 5-325 MG Tab per tablet  - amLODIPine (NORVASC) 5 MG Tab; Take 1 Tablet by mouth see administration instructions. Daily as needed for elevated BP       Chief Complaint:   Chief Complaint   Patient presents with    Follow-Up     FV DX:Primary hypertension       History of Present Illness:  Nuris Mcgill is a 39 y.o. female who returns for long term management of FH CAD/SCD, mixed HLP, HTN, iodine allergy, former smoker.     Father was very physically fit, taken off statin due to statin intolerance and myalgias, was planning to go out for a run, suffered MI and sudden cardiac death, found at home by his wife, her mother.  Her sister  suddenly at 44, we presume heart but were not sure, had hypertension, hyperlipidemia, DM2, smoker, nonadherent ended already had a stroke.    Hypertension, appears controlled.  Checking at home 110-160/80-90s.  On lisinopril without a cough.  Uses amlodipine as needed.    Cholesterol very abnormal, triglycerides over 1000.  LDL could not be calculated.  Has never tried medications.  Normal lipoprotein a.  Apolipoprotein B above goal at 97.  Was on rosuvastatin for about 4 weeks when she had updated blood work done.  LDL cholesterol 82, HDL 35, triglycerides 250.    Quit smoking in .    Family planning, no more pregnancies planned, had tubal ligation.    No history of diabetes.  No history of autoimmune disease such as lupus or rheumatoid arthritis.  No history of chest radiation or cardiotoxic chemotherapy.  No chronic kidney disease.  No ETOH overuse.  No caffeine overuse.  No recreation  substance use.  No hx asthma.  Covid, recovered 100%.    Not limited by chest pain, pressure or tightness with activity.  No significant dyspnea on exertion, orthopnea or lower extremity swelling.  No significant palpitations, lightheadedness, or presyncope/syncope.  No symptoms of leg claudication.  No stroke/TIA like symptoms.    Lives in [place].   to [place].  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    Wt Readings from Last 5 Encounters:   05/09/25 91.2 kg (201 lb)   03/13/25 88.5 kg (195 lb)   02/02/24 89.7 kg (197 lb 12 oz)   12/20/23 95.3 kg (210 lb 1.6 oz)   05/03/22 93.4 kg (206 lb)       DATA REVIEWED by me:  ECG (my personal interpretation)  3-13-25 Sinus 55    Echocardiogram  5-4-22  Normal left ventricular size and systolic function.  Normal right ventricular size and systolic function.  Mild mitral regurgitation.  Mild tricuspid regurgitation.  Normal pericardium without effusion.  Compared to the prior echo on 01/13/21, similar findings.  1-13-21  Normal right and left ventricular size and function.   Left ventricular ejection fraction is visually estimated to be 65%.  Mild concentric left ventricular hypertrophy.  No significant valve disease or flow abnormalities.   Normal estimated right atrial pressure.   Unable to estimate pulmonary artery pressure due to an inadequate   tricuspid regurgitant jet.  No prior study is available for comparison.     CAC  Pending    Most recent labs:       Lab Results   Component Value Date/Time    WBC 8.5 01/02/2025 11:28 AM    HEMOGLOBIN 13.9 01/02/2025 11:28 AM    HEMATOCRIT 45.3 01/02/2025 11:28 AM    MCV 86.3 01/02/2025 11:28 AM    INR 0.91 12/20/2023 02:59 PM      Lab Results   Component Value Date/Time    SODIUM 133 (L) 01/02/2025 11:28 AM    POTASSIUM 4.2 01/02/2025 11:28 AM    CHLORIDE 99 01/02/2025 11:28 AM    CO2 27 01/02/2025 11:28 AM    GLUCOSE 94 01/02/2025 11:28 AM    BUN 10 01/02/2025 11:28 AM    CREATININE 0.56 01/02/2025  "11:28 AM    CREATININE 0.7 2006 04:35 AM      Lab Results   Component Value Date/Time    ASTSGOT 15 2025 11:28 AM    ALTSGPT 26 2025 11:28 AM    ALBUMIN 4.3 2025 11:28 AM      Lab Results   Component Value Date/Time    CHOLSTRLTOT 167 2025 10:14 AM    LDL 82 2025 10:14 AM    HDL 35 (A) 2025 10:14 AM    TRIGLYCERIDE 250 (H) 2025 10:14 AM     No results for input(s): \"NTPROBNP\", \"TROPONINT\" in the last 72 hours.      Past Medical History:   Diagnosis Date    Depression     Essential hypertension     Glaucoma     Dr. Osman    Migraine     since 14 years old    Nephrolithiasis     Optic disc drusen, bilateral     Panic disorder     PTSD (post-traumatic stress disorder)      Past Surgical History:   Procedure Laterality Date    PRIMARY C SECTION WITH TUBAL LIGATION N/A 2022    Procedure:  SECTION, PRIMARY, WITH TUBAL LIGATION;  Surgeon: Mingo Beckham M.D.;  Location[facility];  Service: Obstetrics    PRIMARY C SECTION N/A 3/2/2021    Procedure:  SECTION, PRIMARY;  Surgeon: Kimberly Parker M.D.;  Location: [facility];  Service: Obstetrics    CHOLECYSTECTOMY  2015    MAMMOPLASTY AUGMENTATION  08    Performed by DEYA BRYANT at SURGERY [facility]    APPENDECTOMY      \"ruptured\"    OTHER      ablation back surgery    OTHER Right     skin graft on ear drum     Family History   Problem Relation Age of Onset    Hyperlipidemia Mother     Hypertension Mother     Heart Disease Father 64         of sudden MI, very fit    Heart Attack Father     Heart Disease Sister 44         at 44, no autopsy, DM, smoker, HTN, non-complaint    Stroke Sister     Diabetes Sister     Cancer Maternal Aunt         breast    Alcohol abuse Maternal Aunt     Cancer Maternal Grandmother         colon    Cancer Paternal Grandfather         colon     Social History     Socioeconomic History    Marital status: "      Spouse name: Not on file    Number of children: Not on file    Years of education: Not on file    Highest education level: Not on file   Occupational History    Not on file   Tobacco Use    Smoking status: Former     Current packs/day: 0.00     Types: Cigarettes    Smokeless tobacco: Never   Vaping Use    Vaping status: Never Used   Substance and Sexual Activity    Alcohol use: Yes     Comment: occ     Drug use: No    Sexual activity: Yes     Partners: Male     Comment: Not  and planned pregnancy.   Other Topics Concern    Not on file   Social History Narrative    Not on file     Social Drivers of Health     Financial Resource Strain: Not on file   Food Insecurity: No Food Insecurity (1/8/2021)    Hunger Vital Sign     Worried About Running Out of Food in the Last Year: Never true     Ran Out of Food in the Last Year: Never true   Transportation Needs: No Transportation Needs (1/8/2021)    PRAPARE - Transportation     Lack of Transportation (Medical): No     Lack of Transportation (Non-Medical): No   Physical Activity: Not on file   Stress: Not on file   Social Connections: Not on file   Intimate Partner Violence: Not on file   Housing Stability: Not on file     Allergies   Allergen Reactions    Ceftriaxone Hives     Tolerates cefazolin, penicillin    Iodine Contrast [[employer]] Hives    Pecan Hives and Swelling    [place] Hives and Swelling       Current Outpatient Medications   Medication Sig Dispense Refill    HYDROcodone-acetaminophen (NORCO) 5-325 MG Tab per tablet       propranolol CR (INDERAL LA) 120 MG CAPSULE SR 24 HR Take 1 Capsule by mouth every day. 90 Capsule 3    propranolol LA (INDERAL LA) 60 MG CAPSULE SR 24 HR Take 1 Capsule by mouth see administration instructions. 1 tablet as needed for migraine headache      amLODIPine (NORVASC) 5 MG Tab Take 1 Tablet by mouth see administration instructions. Daily as needed for elevated BP      ezetimibe (ZETIA) 10 MG Tab Take  "1 Tablet by mouth every day. 90 Tablet 3    diazePAM (VALIUM) 5 MG Tab Take 1 Tablet by mouth as needed for Anxiety (Take 30 min prior to MRI, ok to repeat dose if still anxious) for up to 2 doses. 2 Tablet 0    VITAMIN D PO Take 5,000 Units by mouth every day.      rosuvastatin (CRESTOR) 40 MG tablet Take 1 Tablet by mouth every day. 90 Tablet 3    latanoprost (XALATAN) 0.005 % Solution       fluoxetine (PROZAC) 40 MG capsule Take 40 mg by mouth every evening.      lisinopril (PRINIVIL) 40 MG tablet Take 40 mg by mouth every evening.       No current facility-administered medications for this visit.       ROS  All others systems reviewed and negative.    /64 (BP Location: Left arm, Patient Position: Sitting, BP Cuff Size: Adult long)   Pulse 68   Resp 16   Ht 1.575 m (5' 2\")   Wt 91.2 kg (201 lb)   SpO2 95%  Body mass index is 36.76 kg/m².    General: No acute distress. Well nourished.  HEENT: EOM grossly intact, no scleral icterus, no pharyngeal erythema.   Neck:  No JVD, no bruits, trachea midline  CVS: RRR. Normal S1, S2. 1/6 sys murmur R/LSB. No LE edema.  2+ radial pulses, 2+ PT pulses  Resp: CTAB. No wheezing or crackles/rhonchi. Normal respiratory effort.  Abdomen: Soft, NT, no geraldo hepatomegaly.  MSK/Ext: No clubbing or cyanosis.  Skin: Warm and dry, no rashes.  Neurological: CN III-XII grossly intact. No focal deficits.   Psych: A&O x 3, appropriate affect, good judgement    Physical Exam listed below was completed in entrety today and unchanged from 3-13-25 except where noted.  Some elements were copied from my note of that same day, which have been updated where appropriate and all reflect current meedical decision making from today.  I have confirmed and edited as necessary, the PFSH and ROS obtained by others.    Return in about 3 months (around 8/9/2025).    Written instructions given today:      - Start ezetimibe 10 mg once daily in addition to rosuvastatin 40 mg.    - Repeat your fasting " blood work in about 3 months.    - I am sending a new prescription for propranolol long-acting 120 mg to take once daily.  You can take an additional 60 mg tablet if you need to for migraine headaches or elevated blood pressure.    Statin holiday:  Your cholesterol medication can cause muscle aches/sore joints but sometimes it can be difficult to know if it is happening.  Stop your statin therapy for 2 weeks, note if you feel better, resume the therapy at full dose after 2 weeks, note if you feel worse again.  Report back to me if you think your statin is making you feel worse.      It is my pleasure to participate in the care of Ms. Mcgill.  Please do not hesitate to contact me with questions or concerns.    Madhvai Queen MD, [facility]  Cardiologist, [facility]    Please note that this dictation was created using voice recognition software. I have made every reasonable attempt to correct obvious errors, but it is possible there are errors of grammar and possibly content that I did not discover before finalizing the note.

## 2025-05-06 ENCOUNTER — HOSPITAL ENCOUNTER (OUTPATIENT)
Dept: LAB | Facility: MEDICAL CENTER | Age: 40
End: 2025-05-06
Attending: INTERNAL MEDICINE
Payer: COMMERCIAL

## 2025-05-06 DIAGNOSIS — E78.2 MIXED HYPERLIPIDEMIA: ICD-10-CM

## 2025-05-06 LAB
CHOLEST SERPL-MCNC: 167 MG/DL (ref 100–199)
HDLC SERPL-MCNC: 35 MG/DL
LDLC SERPL CALC-MCNC: 82 MG/DL
TRIGL SERPL-MCNC: 250 MG/DL (ref 0–149)

## 2025-05-06 PROCEDURE — 80061 LIPID PANEL: CPT

## 2025-05-06 PROCEDURE — 36415 COLL VENOUS BLD VENIPUNCTURE: CPT

## 2025-05-06 PROCEDURE — 82172 ASSAY OF APOLIPOPROTEIN: CPT

## 2025-05-06 PROCEDURE — 83695 ASSAY OF LIPOPROTEIN(A): CPT

## 2025-05-07 ENCOUNTER — RESULTS FOLLOW-UP (OUTPATIENT)
Dept: CARDIOLOGY | Facility: MEDICAL CENTER | Age: 40
End: 2025-05-07
Payer: COMMERCIAL

## 2025-05-08 LAB
APO B100 SERPL-MCNC: 97 MG/DL (ref 60–117)
LPA SERPL-MCNC: 15 MG/DL

## 2025-05-09 ENCOUNTER — OFFICE VISIT (OUTPATIENT)
Dept: CARDIOLOGY | Facility: MEDICAL CENTER | Age: 40
End: 2025-05-09
Attending: INTERNAL MEDICINE
Payer: COMMERCIAL

## 2025-05-09 VITALS
BODY MASS INDEX: 36.99 KG/M2 | SYSTOLIC BLOOD PRESSURE: 102 MMHG | RESPIRATION RATE: 16 BRPM | DIASTOLIC BLOOD PRESSURE: 64 MMHG | HEART RATE: 68 BPM | WEIGHT: 201 LBS | OXYGEN SATURATION: 95 % | HEIGHT: 62 IN

## 2025-05-09 DIAGNOSIS — G43.E19 CHRONIC MIGRAINE WITH AURA, INTRACTABLE, WITHOUT STATUS MIGRAINOSUS: ICD-10-CM

## 2025-05-09 DIAGNOSIS — Z87.891 FORMER SMOKER: ICD-10-CM

## 2025-05-09 DIAGNOSIS — E78.2 MIXED HYPERLIPIDEMIA: ICD-10-CM

## 2025-05-09 DIAGNOSIS — I10 PRIMARY HYPERTENSION: ICD-10-CM

## 2025-05-09 DIAGNOSIS — Z82.49 FAMILY HISTORY OF CORONARY ARTERY DISEASE IN FATHER: ICD-10-CM

## 2025-05-09 DIAGNOSIS — Z88.8 ALLERGY TO IODINE: ICD-10-CM

## 2025-05-09 DIAGNOSIS — Z82.41 FAMILY HISTORY OF SUDDEN CARDIAC DEATH: ICD-10-CM

## 2025-05-09 DIAGNOSIS — O14.92 PRE-ECLAMPSIA IN SECOND TRIMESTER: ICD-10-CM

## 2025-05-09 PROCEDURE — 99214 OFFICE O/P EST MOD 30 MIN: CPT | Performed by: INTERNAL MEDICINE

## 2025-05-09 PROCEDURE — 3074F SYST BP LT 130 MM HG: CPT | Performed by: INTERNAL MEDICINE

## 2025-05-09 PROCEDURE — 3078F DIAST BP <80 MM HG: CPT | Performed by: INTERNAL MEDICINE

## 2025-05-09 PROCEDURE — 99213 OFFICE O/P EST LOW 20 MIN: CPT | Performed by: INTERNAL MEDICINE

## 2025-05-09 RX ORDER — EZETIMIBE 10 MG/1
10 TABLET ORAL DAILY
Qty: 90 TABLET | Refills: 3 | Status: SHIPPED | OUTPATIENT
Start: 2025-05-09

## 2025-05-09 RX ORDER — AMLODIPINE BESYLATE 5 MG/1
1 TABLET ORAL
COMMUNITY
End: 2025-05-09 | Stop reason: SDUPTHER

## 2025-05-09 RX ORDER — PROPRANOLOL HYDROCHLORIDE 60 MG/1
60 CAPSULE, EXTENDED RELEASE ORAL SEE ADMIN INSTRUCTIONS
COMMUNITY
Start: 2025-05-09

## 2025-05-09 RX ORDER — PROPRANOLOL HYDROCHLORIDE 120 MG/1
120 CAPSULE, EXTENDED RELEASE ORAL DAILY
Qty: 90 CAPSULE | Refills: 3 | Status: SHIPPED | OUTPATIENT
Start: 2025-05-09

## 2025-05-09 RX ORDER — AMLODIPINE BESYLATE 5 MG/1
5 TABLET ORAL SEE ADMIN INSTRUCTIONS
COMMUNITY
Start: 2025-05-09

## 2025-05-09 RX ORDER — HYDROCODONE BITARTRATE AND ACETAMINOPHEN 5; 325 MG/1; MG/1
TABLET ORAL
COMMUNITY
Start: 2025-03-25

## 2025-05-09 ASSESSMENT — FIBROSIS 4 INDEX: FIB4 SCORE: 0.35

## 2025-05-09 NOTE — PATIENT INSTRUCTIONS
- Start ezetimibe 10 mg once daily in addition to rosuvastatin 40 mg.    - Repeat your fasting blood work in about 3 months.    - I am sending a new prescription for propranolol long-acting 120 mg to take once daily.  You can take an additional 60 mg tablet if you need to for migraine headaches or elevated blood pressure.    Statin holiday:  Your cholesterol medication can cause muscle aches/sore joints but sometimes it can be difficult to know if it is happening.  Stop your statin therapy for 2 weeks, note if you feel better, resume the therapy at full dose after 2 weeks, note if you feel worse again.  Report back to me if you think your statin is making you feel worse.

## 2025-05-31 ENCOUNTER — HOSPITAL ENCOUNTER (OUTPATIENT)
Dept: RADIOLOGY | Facility: MEDICAL CENTER | Age: 40
End: 2025-05-31
Attending: INTERNAL MEDICINE
Payer: COMMERCIAL

## 2025-05-31 ENCOUNTER — HOSPITAL ENCOUNTER (OUTPATIENT)
Dept: RADIOLOGY | Facility: MEDICAL CENTER | Age: 40
End: 2025-05-31
Attending: STUDENT IN AN ORGANIZED HEALTH CARE EDUCATION/TRAINING PROGRAM
Payer: COMMERCIAL

## 2025-05-31 DIAGNOSIS — D32.9 MENINGIOMA (HCC): ICD-10-CM

## 2025-05-31 DIAGNOSIS — E78.2 MIXED HYPERLIPIDEMIA: ICD-10-CM

## 2025-05-31 PROCEDURE — 4410556 CT-CARDIAC SCORING (SELF PAY ONLY)

## 2025-05-31 PROCEDURE — 70553 MRI BRAIN STEM W/O & W/DYE: CPT

## 2025-05-31 PROCEDURE — A9579 GAD-BASE MR CONTRAST NOS,1ML: HCPCS | Mod: JZ | Performed by: STUDENT IN AN ORGANIZED HEALTH CARE EDUCATION/TRAINING PROGRAM

## 2025-05-31 PROCEDURE — 700117 HCHG RX CONTRAST REV CODE 255: Mod: JZ | Performed by: STUDENT IN AN ORGANIZED HEALTH CARE EDUCATION/TRAINING PROGRAM

## 2025-05-31 RX ORDER — GADOTERIDOL 279.3 MG/ML
19 INJECTION INTRAVENOUS ONCE
Status: COMPLETED | OUTPATIENT
Start: 2025-05-31 | End: 2025-05-31

## 2025-05-31 RX ADMIN — GADOTERIDOL 19 ML: 279.3 INJECTION, SOLUTION INTRAVENOUS at 16:32

## 2025-06-02 ENCOUNTER — RESULTS FOLLOW-UP (OUTPATIENT)
Dept: CARDIOLOGY | Facility: MEDICAL CENTER | Age: 40
End: 2025-06-02
Payer: COMMERCIAL

## 2025-06-02 ENCOUNTER — PATIENT MESSAGE (OUTPATIENT)
Dept: OPHTHALMOLOGY | Facility: MEDICAL CENTER | Age: 40
End: 2025-06-02
Payer: COMMERCIAL

## 2025-06-02 DIAGNOSIS — D32.9 MENINGIOMA (HCC): Primary | ICD-10-CM

## 2025-08-22 ENCOUNTER — APPOINTMENT (OUTPATIENT)
Facility: MEDICAL CENTER | Age: 40
End: 2025-08-22
Payer: COMMERCIAL

## 2025-08-22 DIAGNOSIS — Z88.8 ALLERGY TO IODINE: ICD-10-CM

## 2025-08-22 DIAGNOSIS — G43.E19 CHRONIC MIGRAINE WITH AURA, INTRACTABLE, WITHOUT STATUS MIGRAINOSUS: ICD-10-CM

## 2025-08-22 DIAGNOSIS — Z82.49 FAMILY HISTORY OF CORONARY ARTERY DISEASE IN FATHER: ICD-10-CM

## 2025-08-22 DIAGNOSIS — I10 PRIMARY HYPERTENSION: ICD-10-CM

## 2025-08-22 DIAGNOSIS — O14.92 PRE-ECLAMPSIA IN SECOND TRIMESTER: ICD-10-CM

## 2025-08-22 DIAGNOSIS — Z82.41 FAMILY HISTORY OF SUDDEN CARDIAC DEATH: ICD-10-CM

## 2025-08-22 DIAGNOSIS — E78.1 HYPERTRIGLYCERIDEMIA: ICD-10-CM

## 2025-08-22 DIAGNOSIS — I25.10 CORONARY ARTERY CALCIFICATION SEEN ON CT SCAN: ICD-10-CM

## 2025-08-22 DIAGNOSIS — E78.2 MIXED HYPERLIPIDEMIA: Primary | ICD-10-CM

## 2025-08-22 DIAGNOSIS — Z87.891 FORMER SMOKER: ICD-10-CM
